# Patient Record
Sex: MALE | Race: WHITE | NOT HISPANIC OR LATINO
[De-identification: names, ages, dates, MRNs, and addresses within clinical notes are randomized per-mention and may not be internally consistent; named-entity substitution may affect disease eponyms.]

---

## 2019-01-03 PROBLEM — Z00.00 ENCOUNTER FOR PREVENTIVE HEALTH EXAMINATION: Status: ACTIVE | Noted: 2019-01-03

## 2019-01-08 ENCOUNTER — APPOINTMENT (OUTPATIENT)
Dept: ORTHOPEDIC SURGERY | Facility: CLINIC | Age: 59
End: 2019-01-08
Payer: COMMERCIAL

## 2019-01-08 VITALS — BODY MASS INDEX: 32.58 KG/M2 | HEIGHT: 69 IN | WEIGHT: 220 LBS

## 2019-01-08 DIAGNOSIS — M17.11 UNILATERAL PRIMARY OSTEOARTHRITIS, RIGHT KNEE: ICD-10-CM

## 2019-01-08 DIAGNOSIS — Z86.79 PERSONAL HISTORY OF OTHER DISEASES OF THE CIRCULATORY SYSTEM: ICD-10-CM

## 2019-01-08 DIAGNOSIS — M17.12 UNILATERAL PRIMARY OSTEOARTHRITIS, LEFT KNEE: ICD-10-CM

## 2019-01-08 PROCEDURE — 99203 OFFICE O/P NEW LOW 30 MIN: CPT

## 2019-01-08 RX ORDER — RAMIPRIL 5 MG/1
CAPSULE ORAL
Refills: 0 | Status: ACTIVE | COMMUNITY

## 2019-01-08 RX ORDER — ROSUVASTATIN CALCIUM 5 MG/1
TABLET, FILM COATED ORAL
Refills: 0 | Status: ACTIVE | COMMUNITY

## 2019-01-08 RX ORDER — ASPIRIN 81 MG
81 TABLET, DELAYED RELEASE (ENTERIC COATED) ORAL
Refills: 0 | Status: ACTIVE | COMMUNITY

## 2019-01-08 RX ORDER — METOPROLOL TARTRATE 75 MG/1
TABLET, FILM COATED ORAL
Refills: 0 | Status: ACTIVE | COMMUNITY

## 2019-01-08 NOTE — PHYSICAL EXAM
[Normal] : normal [Knee Motion Right] : full ROM [Knee Motion Left] : full ROM [Acute Distress] : not in acute distress [Knee Swelling Right] : no swelling [Knee Tenderness On Palpation Right] : no tenderness [Knee Anterior Drawer Sign Right] : negative anterior drawer sign [Knee Swelling Left] : no swelling [Knee Tenderness On Palpation Left] : no tenderness [Knee Anterior Drawer Sign Left] : negative anterior drawer sign [FreeTextEntry2] : Varus knees bilaterally

## 2019-01-08 NOTE — HISTORY OF PRESENT ILLNESS
[Joint Pain] : joint pain [Joint Stiffness] : joint stiffness [Joint Swelling] : joint swelling [Past Hx] : past medical [Fam Hx] : family [Soc Hx] : social [All Hx] : past medical, family, and social [Meds] : medication [Allergies] : allergy [All] : medication and allergy [Chills] : no chills [Discharge] : no discharge [Dec Hearing] : no hearing loss [Chest Pain] : no chest pain [Dyspnea] : no shortness of breath [Abdominal Pain] : no abdominal pain [Vomiting] : no vomiting [Pelvic Pain] : no pelvic pain [Frequency] : no frequency [Arthralgias] : no arthralgias [Breast Pain] : no breast pain [Headache] : no headache [Anxiety] : no anxiety [Deepening Voice] : no deepening of the voice [Easy Bleeding] : does not bleed easily [FreeTextEntry1] : Bilateral Knee Pain [FreeTextEntry2] : Patient report intermittent sharp bilateral medial  knee pain for over a year.  Right worse than left. Patient has ceased running 3 months ago due to pain.  No fall or trauma.  Some pain at night, no instability or weakness.  Patient had right knee meniscus surgery 3 years ago by Dr. Castillo and left knee arthroscoped 20 years ago.  No NSAID use , PT or injection therapy.

## 2022-05-17 ENCOUNTER — TRANSCRIPTION ENCOUNTER (OUTPATIENT)
Age: 62
End: 2022-05-17